# Patient Record
Sex: MALE | Race: WHITE | NOT HISPANIC OR LATINO | Employment: FULL TIME | URBAN - METROPOLITAN AREA
[De-identification: names, ages, dates, MRNs, and addresses within clinical notes are randomized per-mention and may not be internally consistent; named-entity substitution may affect disease eponyms.]

---

## 2021-10-01 ENCOUNTER — OFFICE VISIT (OUTPATIENT)
Dept: NEUROSURGERY | Facility: CLINIC | Age: 31
End: 2021-10-01
Payer: COMMERCIAL
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
## 2021-10-01 DIAGNOSIS — S22.080A COMPRESSION FRACTURE OF T12 VERTEBRA, INITIAL ENCOUNTER: Primary | ICD-10-CM

## 2021-10-01 PROCEDURE — 99999 PR PBB SHADOW E&M-EST. PATIENT-LVL II: CPT | Mod: PBBFAC,,, | Performed by: PHYSICIAN ASSISTANT

## 2021-10-01 PROCEDURE — 1160F PR REVIEW ALL MEDS BY PRESCRIBER/CLIN PHARMACIST DOCUMENTED: ICD-10-PCS | Mod: CPTII,S$GLB,, | Performed by: PHYSICIAN ASSISTANT

## 2021-10-01 PROCEDURE — 1159F MED LIST DOCD IN RCRD: CPT | Mod: CPTII,S$GLB,, | Performed by: PHYSICIAN ASSISTANT

## 2021-10-01 PROCEDURE — 1159F PR MEDICATION LIST DOCUMENTED IN MEDICAL RECORD: ICD-10-PCS | Mod: CPTII,S$GLB,, | Performed by: PHYSICIAN ASSISTANT

## 2021-10-01 PROCEDURE — 99204 PR OFFICE/OUTPT VISIT, NEW, LEVL IV, 45-59 MIN: ICD-10-PCS | Mod: S$GLB,,, | Performed by: PHYSICIAN ASSISTANT

## 2021-10-01 PROCEDURE — 1160F RVW MEDS BY RX/DR IN RCRD: CPT | Mod: CPTII,S$GLB,, | Performed by: PHYSICIAN ASSISTANT

## 2021-10-01 PROCEDURE — 99204 OFFICE O/P NEW MOD 45 MIN: CPT | Mod: S$GLB,,, | Performed by: PHYSICIAN ASSISTANT

## 2021-10-01 PROCEDURE — 99999 PR PBB SHADOW E&M-EST. PATIENT-LVL II: ICD-10-PCS | Mod: PBBFAC,,, | Performed by: PHYSICIAN ASSISTANT

## 2021-10-01 RX ORDER — TIZANIDINE 4 MG/1
4 TABLET ORAL EVERY 8 HOURS PRN
Qty: 40 TABLET | Refills: 0 | Status: SHIPPED | OUTPATIENT
Start: 2021-10-01

## 2021-10-01 RX ORDER — HYDROCODONE BITARTRATE AND ACETAMINOPHEN 5; 325 MG/1; MG/1
1 TABLET ORAL EVERY 8 HOURS PRN
Qty: 21 TABLET | Refills: 0 | Status: SHIPPED | OUTPATIENT
Start: 2021-10-01

## 2021-10-15 ENCOUNTER — HOSPITAL ENCOUNTER (OUTPATIENT)
Dept: RADIOLOGY | Facility: HOSPITAL | Age: 31
Discharge: HOME | End: 2021-10-15
Attending: PHYSICIAN ASSISTANT
Payer: COMMERCIAL
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
## 2021-10-15 DIAGNOSIS — S22.080A COMPRESSION FRACTURE OF T12 VERTEBRA, INITIAL ENCOUNTER: ICD-10-CM

## 2021-10-15 PROCEDURE — 72146 MRI CHEST SPINE W/O DYE: CPT | Mod: 26,,, | Performed by: RADIOLOGY

## 2021-10-15 PROCEDURE — 72148 MRI LUMBAR SPINE WITHOUT CONTRAST: ICD-10-PCS | Mod: 26,,, | Performed by: RADIOLOGY

## 2021-10-15 PROCEDURE — 72148 MRI LUMBAR SPINE W/O DYE: CPT | Mod: TC,PO

## 2021-10-15 PROCEDURE — 72146 MRI CHEST SPINE W/O DYE: CPT | Mod: TC,PO

## 2021-10-15 PROCEDURE — 72148 MRI LUMBAR SPINE W/O DYE: CPT | Mod: 26,,, | Performed by: RADIOLOGY

## 2021-10-15 PROCEDURE — 72146 MRI THORACIC SPINE WITHOUT CONTRAST: ICD-10-PCS | Mod: 26,,, | Performed by: RADIOLOGY

## 2021-10-17 ENCOUNTER — PATIENT MESSAGE (OUTPATIENT)
Dept: NEUROSURGERY | Facility: CLINIC | Age: 31
End: 2021-10-17
Payer: COMMERCIAL

## 2021-10-28 ENCOUNTER — OFFICE VISIT (OUTPATIENT)
Dept: NEUROSURGERY | Facility: CLINIC | Age: 31
End: 2021-10-28
Payer: COMMERCIAL

## 2021-10-28 VITALS — RESPIRATION RATE: 16 BRPM | BODY MASS INDEX: 22.19 KG/M2 | WEIGHT: 155 LBS | HEIGHT: 70 IN

## 2021-10-28 DIAGNOSIS — S22.080D COMPRESSION FRACTURE OF T12 VERTEBRA WITH ROUTINE HEALING, SUBSEQUENT ENCOUNTER: ICD-10-CM
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
## 2021-10-28 PROCEDURE — 99999 PR PBB SHADOW E&M-EST. PATIENT-LVL III: ICD-10-PCS | Mod: PBBFAC,,, | Performed by: PHYSICIAN ASSISTANT

## 2021-10-28 PROCEDURE — 99213 OFFICE O/P EST LOW 20 MIN: CPT | Mod: S$GLB,,, | Performed by: PHYSICIAN ASSISTANT

## 2021-10-28 PROCEDURE — 99999 PR PBB SHADOW E&M-EST. PATIENT-LVL III: CPT | Mod: PBBFAC,,, | Performed by: PHYSICIAN ASSISTANT

## 2021-10-28 PROCEDURE — 99213 PR OFFICE/OUTPT VISIT, EST, LEVL III, 20-29 MIN: ICD-10-PCS | Mod: S$GLB,,, | Performed by: PHYSICIAN ASSISTANT

## 2021-12-14 ENCOUNTER — HOSPITAL ENCOUNTER (OUTPATIENT)
Dept: RADIOLOGY | Facility: HOSPITAL | Age: 31
Discharge: HOME | End: 2021-12-14
Attending: PHYSICIAN ASSISTANT
Payer: COMMERCIAL

## 2021-12-14 ENCOUNTER — OFFICE VISIT (OUTPATIENT)
Dept: NEUROSURGERY | Facility: CLINIC | Age: 31
End: 2021-12-14
Payer: COMMERCIAL

## 2021-12-14 VITALS — WEIGHT: 155 LBS | RESPIRATION RATE: 16 BRPM | BODY MASS INDEX: 22.19 KG/M2 | HEIGHT: 70 IN

## 2021-12-14 DIAGNOSIS — S22.080D COMPRESSION FRACTURE OF T12 VERTEBRA WITH ROUTINE HEALING, SUBSEQUENT ENCOUNTER: ICD-10-CM

## 2021-12-14 DIAGNOSIS — M54.6 DORSALGIA OF THORACOLUMBAR REGION: ICD-10-CM

## 2021-12-14 DIAGNOSIS — M54.50 DORSALGIA OF THORACOLUMBAR REGION: ICD-10-CM
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
## 2021-12-14 DIAGNOSIS — S22.080D COMPRESSION FRACTURE OF T12 VERTEBRA WITH ROUTINE HEALING, SUBSEQUENT ENCOUNTER: Primary | ICD-10-CM

## 2021-12-14 PROCEDURE — 99213 OFFICE O/P EST LOW 20 MIN: CPT | Mod: S$GLB,,, | Performed by: PHYSICIAN ASSISTANT

## 2021-12-14 PROCEDURE — 99999 PR PBB SHADOW E&M-EST. PATIENT-LVL III: ICD-10-PCS | Mod: PBBFAC,,, | Performed by: PHYSICIAN ASSISTANT

## 2021-12-14 PROCEDURE — 72080 XR THORACOLUMBAR SPINE AP LATERAL: ICD-10-PCS | Mod: 26,,, | Performed by: RADIOLOGY

## 2021-12-14 PROCEDURE — 99213 PR OFFICE/OUTPT VISIT, EST, LEVL III, 20-29 MIN: ICD-10-PCS | Mod: S$GLB,,, | Performed by: PHYSICIAN ASSISTANT

## 2021-12-14 PROCEDURE — 72080 X-RAY EXAM THORACOLMB 2/> VW: CPT | Mod: 26,,, | Performed by: RADIOLOGY

## 2021-12-14 PROCEDURE — 99999 PR PBB SHADOW E&M-EST. PATIENT-LVL III: CPT | Mod: PBBFAC,,, | Performed by: PHYSICIAN ASSISTANT

## 2021-12-14 PROCEDURE — 72080 X-RAY EXAM THORACOLMB 2/> VW: CPT | Mod: TC

## 2021-12-18 ENCOUNTER — PATIENT MESSAGE (OUTPATIENT)
Dept: NEUROSURGERY | Facility: CLINIC | Age: 31
End: 2021-12-18
Payer: COMMERCIAL

## 2022-01-03 ENCOUNTER — CLINICAL SUPPORT (OUTPATIENT)
Dept: REHABILITATION | Facility: HOSPITAL | Age: 32
End: 2022-01-03
Attending: PHYSICIAN ASSISTANT
Payer: COMMERCIAL

## 2022-01-03 DIAGNOSIS — S22.080D COMPRESSION FRACTURE OF T12 VERTEBRA WITH ROUTINE HEALING, SUBSEQUENT ENCOUNTER: ICD-10-CM

## 2022-01-03 DIAGNOSIS — M54.6 ACUTE BILATERAL THORACIC BACK PAIN: ICD-10-CM

## 2022-01-03 DIAGNOSIS — M54.50 DORSALGIA OF THORACOLUMBAR REGION: ICD-10-CM

## 2022-01-03 DIAGNOSIS — M54.6 DORSALGIA OF THORACOLUMBAR REGION: ICD-10-CM

## 2022-01-03 PROCEDURE — 97110 THERAPEUTIC EXERCISES: CPT | Performed by: PHYSICAL THERAPIST

## 2022-01-03 PROCEDURE — 97140 MANUAL THERAPY 1/> REGIONS: CPT | Performed by: PHYSICAL THERAPIST

## 2022-01-03 PROCEDURE — 97161 PT EVAL LOW COMPLEX 20 MIN: CPT | Performed by: PHYSICAL THERAPIST

## 2022-01-03 NOTE — PATIENT INSTRUCTIONS
Posture - Sitting    Sit upright, head facing forward. Scoot your tailbone all the way to the back of your chair. Lean slightly forward and place a rolled up towel at your waist. Lean back so shoulder blades lightly touch the back of the chair. Keep shoulders relaxed, and avoid rounded back. Keep hips level with knees. Avoid crossing legs for long periods.       Prayer Stretch    On hands and knees reach forward until a stretch is felt in the mid-back. Hold 15 seconds. Repeat reaching to right and to left.  Repeat 4 times per set. Do 1 sets per session. Do 1 sessions per day.        Bridging    Flatten back against floor then slowly raise buttocks from floor, keeping stomach tight. Hold 3 seconds.  Repeat 20 times per set. Do 2 sets per session. Do 1 sessions per day.      Bent Leg Lift (Hook-Lying)    Tighten stomach and slowly raise right leg from floor. Keep trunk rigid. Slowly lower and switch sides.  Repeat 10 times per set. Do 3 sets per session. Do 1 sessions per day.      Slow Stand to Sit    Stand up, using hands if needed. Keep chest and head upright, bend your knees, don't use hands, and slowly lower back to the chair. Move as slowly as you can.  Repeat 12 times per set. Do 3 sets per session. Do 1 sessions per day.    Copyright © I. All rights reserved.
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
## 2022-01-05 PROBLEM — M54.6 ACUTE BILATERAL THORACIC BACK PAIN: Status: ACTIVE | Noted: 2022-01-05

## 2022-01-10 ENCOUNTER — CLINICAL SUPPORT (OUTPATIENT)
Dept: REHABILITATION | Facility: HOSPITAL | Age: 32
End: 2022-01-10
Payer: COMMERCIAL

## 2022-01-10 DIAGNOSIS — M54.6 ACUTE BILATERAL THORACIC BACK PAIN: ICD-10-CM

## 2022-01-10 PROCEDURE — 97110 THERAPEUTIC EXERCISES: CPT | Performed by: PHYSICAL THERAPIST

## 2022-01-12 ENCOUNTER — CLINICAL SUPPORT (OUTPATIENT)
Dept: REHABILITATION | Facility: HOSPITAL | Age: 32
End: 2022-01-12
Payer: COMMERCIAL

## 2022-01-12 DIAGNOSIS — M54.6 ACUTE BILATERAL THORACIC BACK PAIN: ICD-10-CM

## 2022-01-12 PROCEDURE — 97110 THERAPEUTIC EXERCISES: CPT | Performed by: PHYSICAL THERAPIST

## 2022-01-19 ENCOUNTER — CLINICAL SUPPORT (OUTPATIENT)
Dept: REHABILITATION | Facility: HOSPITAL | Age: 32
End: 2022-01-19
Payer: COMMERCIAL

## 2022-01-19 DIAGNOSIS — M54.6 ACUTE BILATERAL THORACIC BACK PAIN: ICD-10-CM

## 2022-01-19 PROCEDURE — 97110 THERAPEUTIC EXERCISES: CPT | Performed by: PHYSICAL THERAPIST

## 2022-01-20 NOTE — PROGRESS NOTES
"[facility]   Physical Therapy Treatment Note     Name: Chad Rajan  Clinic Number: [id number]    Therapy Diagnosis:   Encounter Diagnosis   Name Primary?    Acute bilateral thoracic back pain      Physician: Mustapha Wilson PA*    Visit Date: 1/12/2022    Physician Orders: PT Eval and Treat   Medical Diagnosis from Referral:   S22.080D (ICD-10-CM) - Compression fracture of T12 vertebra with routine healing, subsequent encounter   M54.6,M54.50 (ICD-10-CM) - Dorsalgia of thoracolumbar region         Evaluation Date: 1/3/2022  Authorization Period Expiration: 12/31/2022  Plan of Care Expiration: 2/17/2022  Visit # / Visits authorized: 3/20  FOTO: 1/3      PTA Visit #: 0/5     Time In: 215  Time Out: 300  Total Billable Time: 45 minutes    Precautions: Standard    SUBJECTIVE     Pt reports: no significant soreness noted. Pt feels improving symptoms and is becoming more active  .  He was compliant with home exercise program.  Response to previous treatment: soreness  Functional change: None yet    Pain: 2/10  Location: bilateral back      OBJECTIVE     Objective Measures updated at progress report unless specified.     TREATMENT     Chad received the treatments listed below:      Chad received therapeutic exercises to develop strength, posture and core stabilization for 45 minutes including:  Nustep 6'  Bridges with band GTB 30x  Single leg bridges 20x B  Side planks with hip ABD 20x each leg  Quadruped hip ABD BTB 25x each leg  Quadruped hip extension, 3# 25x  Lateral squat walks RTB 3 laps  Monster walks RTB 3 laps  Pallof press 4 plates 25x  Prone UE/LE alt on stability ball 20x  Standing clamshells GTB 30x each leg  Side plank hold, 20" x 3 each side  Tall kneel pallof press with arm raise 20x each side, 3 plates      Chad received the following manual therapy techniques: Myofacial release and Soft tissue Mobilization were applied to the: low back for 0 minutes, " including:      Chad participated in neuromuscular re-education activities to improve: Balance, Sense and Posture for 0 minutes. The following activities were included:      Chad participated in dynamic functional therapeutic activities to improve functional performance for 0  minutes, including:        Patient Education and Home Exercises     Education provided:   - HEP provided  - continue progress strengthening program at home     Written Home Exercises Provided: yes.  Exercises were reviewed and Chad was able to demonstrate them prior to the end of the session.  Chad demonstrated good  understanding of the education provided.      See EMR under Patient Instructions for exercises provided on evaluatin.    ASSESSMENT     Pt increased core strengthening today with no significant increase in pain overall. Pt feels improving symptoms and can perform more strenuous activity. Pt will continue to benefit from further core and hip strengthening to help stabilize spine from fracture.      Chad Is progressing well towards his goals.   Pt prognosis is Excellent.     Pt will continue to benefit from skilled outpatient physical therapy to address the deficits listed in the problem list box on initial evaluation, provide pt/family education and to maximize pt's level of independence in the home and community environment.     Pt's spiritual, cultural and educational needs considered and pt agreeable to plan of care and goals.     Anticipated barriers to physical therapy: None    Goals:   Short Term Goals (3 Weeks):  1. Patient will be compliant with home exercise program to supplement therapy in promoting functional mobility.  2. Patient will perform box lift (10#) with good control to demonstrate improved core strength.     Long Term Goals (6 Weeks):   1. Patient will improve FOTO score to </= 15% limited to decrease perceived limitation with maintaining/changing body position.   2. Patient will perform deep squat  with 25# with good control to demonstrate improved core strength.  3. Patient will improve impaired lower extremity myotomes/manual muscle tests to >/=5/5 to improve strength for functional tasks.    PLAN     Continue with physical therapy as planned.     Plan of care Certification: 1/3/2022 to 2/17/2022.     Outpatient Physical Therapy 2 times weekly for 6 weeks to include the following interventions: Electrical Stimulation IFC, Gait Training, Manual Therapy, Moist Heat/ Ice, Neuromuscular Re-ed, Orthotic Management and Training, Patient Education, Self Care, Therapeutic Activities, Therapeutic Exercise and Ultrasound, ASTYM, Kinesiotaping PRN, Functional Dry Needling    Ben Chairez, PT, DPT

## 2022-01-20 NOTE — PROGRESS NOTES
"[facility]   Physical Therapy Treatment Note     Name: Chad Rajan  Clinic Number: [id number]    Therapy Diagnosis:   Encounter Diagnosis   Name Primary?    Acute bilateral thoracic back pain      Physician: Mustapha Wilson PA*    Visit Date: 1/10/2022    Physician Orders: PT Eval and Treat   Medical Diagnosis from Referral:   S22.080D (ICD-10-CM) - Compression fracture of T12 vertebra with routine healing, subsequent encounter   M54.6,M54.50 (ICD-10-CM) - Dorsalgia of thoracolumbar region         Evaluation Date: 1/3/2022  Authorization Period Expiration: 12/31/2022  Plan of Care Expiration: 2/17/2022  Visit # / Visits authorized: 2/20  FOTO: 1/3      PTA Visit #: 0/5     Time In: 200  Time Out: 245  Total Billable Time: 45 minutes    Precautions: Standard    SUBJECTIVE     Pt reports: some soreness in back following previous visit, went down next day. No increase in pain overall.  .  He was compliant with home exercise program.  Response to previous treatment: soreness  Functional change: None yet    Pain: 2/10  Location: bilateral back      OBJECTIVE     Objective Measures updated at progress report unless specified.     TREATMENT     Chad received the treatments listed below:      Chad received therapeutic exercises to develop strength, posture and core stabilization for 45 minutes including:  Nustep 6'  Bridges with band GTB 30x  Sidelying hip ABD 3x8 bilateral  Quadruped hip extension, 2# 25x  Lateral squat walks RTB 3 laps  Monster walks RTB 3 laps  Pallof press 4 plates 25x  Prone UE/LE alt on stability ball 20x  LTR 5" hold, 15x  Janice pose 15" hold, 5x      Chad received the following manual therapy techniques: Myofacial release and Soft tissue Mobilization were applied to the: low back for 0 minutes, including:      Chad participated in neuromuscular re-education activities to improve: Balance, Sense and Posture for 0 minutes. The following activities were " included:      Chad participated in dynamic functional therapeutic activities to improve functional performance for 0  minutes, including:        Patient Education and Home Exercises     Education provided:   - HEP provided  - continue progress strengthening program at home     Written Home Exercises Provided: yes.  Exercises were reviewed and Chad was able to demonstrate them prior to the end of the session.  Chad demonstrated good  understanding of the education provided.      See EMR under Patient Instructions for exercises provided on evaluatin.    ASSESSMENT     Pt tolerated increase in core strengthening and hip strengthening with minimal discomfort overall. No increase in pain to low back area where fx is located. Pt will continue to benefit from further core strength progressions along with hip strengthening progression. Will monitor pain and soreness and report back with any acute/sharp pains.     Chad Is progressing well towards his goals.   Pt prognosis is Excellent.     Pt will continue to benefit from skilled outpatient physical therapy to address the deficits listed in the problem list box on initial evaluation, provide pt/family education and to maximize pt's level of independence in the home and community environment.     Pt's spiritual, cultural and educational needs considered and pt agreeable to plan of care and goals.     Anticipated barriers to physical therapy: Nont    Goals:   Short Term Goals (3 Weeks):  1. Patient will be compliant with home exercise program to supplement therapy in promoting functional mobility.  2. Patient will perform box lift (10#) with good control to demonstrate improved core strength.     Long Term Goals (6 Weeks):   1. Patient will improve FOTO score to </= 15% limited to decrease perceived limitation with maintaining/changing body position.   2. Patient will perform deep squat with 25# with good control to demonstrate improved core strength.  3. Patient will  improve impaired lower extremity myotomes/manual muscle tests to >/=5/5 to improve strength for functional tasks.    PLAN     Continue with physical therapy as planned.     Plan of care Certification: 1/3/2022 to 2/17/2022.     Outpatient Physical Therapy 2 times weekly for 6 weeks to include the following interventions: Electrical Stimulation IFC, Gait Training, Manual Therapy, Moist Heat/ Ice, Neuromuscular Re-ed, Orthotic Management and Training, Patient Education, Self Care, Therapeutic Activities, Therapeutic Exercise and Ultrasound, ASTYM, Kinesiotaping PRN, Functional Dry Needling    Ben Chairez, PT, DPT

## 2022-01-22 NOTE — PROGRESS NOTES
"[facility]   Physical Therapy Treatment Note     Name: Chad Rajan  Clinic Number: [id number]    Therapy Diagnosis:   Encounter Diagnosis   Name Primary?    Acute bilateral thoracic back pain      Physician: Mustapha Wilson PA*    Visit Date: 1/19/2022    Physician Orders: PT Eval and Treat   Medical Diagnosis from Referral:   S22.080D (ICD-10-CM) - Compression fracture of T12 vertebra with routine healing, subsequent encounter   M54.6,M54.50 (ICD-10-CM) - Dorsalgia of thoracolumbar region         Evaluation Date: 1/3/2022  Authorization Period Expiration: 12/31/2022  Plan of Care Expiration: 2/17/2022  Visit # / Visits authorized: 3/20  FOTO: 1/3      PTA Visit #: 0/5     Time In: 215  Time Out: 300  Total Billable Time: 45 minutes    Precautions: Standard    SUBJECTIVE     Pt reports: continues to have some low back soreness following session but no significant increase in sharp pains or instability feelings.   .  He was compliant with home exercise program.  Response to previous treatment: soreness  Functional change: None yet    Pain: 2/10  Location: bilateral back      OBJECTIVE     Objective Measures updated at progress report unless specified.     TREATMENT     Chad received the treatments listed below:      Chad received therapeutic exercises to develop strength, posture and core stabilization for 45 minutes including:  Elliptical 6'  Bridges with band GTB 30x  Single leg bridges 20x B  Side planks with hip ABD 20x each leg  Quadruped hip ABD BTB 25x each leg  Quadruped hip extension, 3# 25x  Lateral squat walks GTB 3 laps  Monster walks GTB 3 laps  Pallof press 4 plates 25x  Prone UE/LE alt on stability ball 20x  Standing clamshells GTB 30x each leg  Side plank hold, 20" x 3 each side  Table top position:  Marches (4) 15x  UE (20x)  Combined UE and LE 2x12  KB pullovers 7.5# (20x)    HELD:  Tall kneel pallof press with arm raise 20x each side, 3 plates      Chad " received the following manual therapy techniques: Myofacial release and Soft tissue Mobilization were applied to the: low back for 0 minutes, including:      Chad participated in neuromuscular re-education activities to improve: Balance, Sense and Posture for 0 minutes. The following activities were included:      Chad participated in dynamic functional therapeutic activities to improve functional performance for 0  minutes, including:        Patient Education and Home Exercises     Education provided:   - HEP provided  - continue progress strengthening program at home     Written Home Exercises Provided: yes.  Exercises were reviewed and Chad was able to demonstrate them prior to the end of the session.  Chad demonstrated good  understanding of the education provided.      See EMR under Patient Instructions for exercises provided on evaluatin.    ASSESSMENT     Pt increased more flexion strengthening in core today with no increase in symptoms overall. Pt feels improving overall body endurance and strength. No increase in sharp, acute pains. Pt will continue to benefit from further core strengthening to stabilize low back from fracture. .      Chad Is progressing well towards his goals.   Pt prognosis is Excellent.     Pt will continue to benefit from skilled outpatient physical therapy to address the deficits listed in the problem list box on initial evaluation, provide pt/family education and to maximize pt's level of independence in the home and community environment.     Pt's spiritual, cultural and educational needs considered and pt agreeable to plan of care and goals.     Anticipated barriers to physical therapy: None    Goals:   Short Term Goals (3 Weeks):  1. Patient will be compliant with home exercise program to supplement therapy in promoting functional mobility.  2. Patient will perform box lift (10#) with good control to demonstrate improved core strength.     Long Term Goals (6 Weeks):   1.  Patient will improve FOTO score to </= 15% limited to decrease perceived limitation with maintaining/changing body position.   2. Patient will perform deep squat with 25# with good control to demonstrate improved core strength.  3. Patient will improve impaired lower extremity myotomes/manual muscle tests to >/=5/5 to improve strength for functional tasks.    PLAN     Continue with physical therapy as planned.     Plan of care Certification: 1/3/2022 to 2/17/2022.     Outpatient Physical Therapy 2 times weekly for 6 weeks to include the following interventions: Electrical Stimulation IFC, Gait Training, Manual Therapy, Moist Heat/ Ice, Neuromuscular Re-ed, Orthotic Management and Training, Patient Education, Self Care, Therapeutic Activities, Therapeutic Exercise and Ultrasound, ASTYM, Kinesiotaping PRN, Functional Dry Needling    Ben Chairez, PT, DPT

## 2022-01-27 ENCOUNTER — CLINICAL SUPPORT (OUTPATIENT)
Dept: REHABILITATION | Facility: HOSPITAL | Age: 32
End: 2022-01-27
Payer: COMMERCIAL

## 2022-01-27 DIAGNOSIS — M54.6 ACUTE BILATERAL THORACIC BACK PAIN: ICD-10-CM

## 2022-01-27 PROCEDURE — 97110 THERAPEUTIC EXERCISES: CPT | Performed by: PHYSICAL THERAPIST

## 2022-01-27 NOTE — PROGRESS NOTES
"[facility]   Physical Therapy Treatment Note     Name: Chad Rajan  Clinic Number: [id number]    Therapy Diagnosis:   Encounter Diagnosis   Name Primary?    Acute bilateral thoracic back pain      Physician: Mustapha Wilson PA*    Visit Date: 1/27/2022    Physician Orders: PT Eval and Treat   Medical Diagnosis from Referral:   S22.080D (ICD-10-CM) - Compression fracture of T12 vertebra with routine healing, subsequent encounter   M54.6,M54.50 (ICD-10-CM) - Dorsalgia of thoracolumbar region         Evaluation Date: 1/3/2022  Authorization Period Expiration: 12/31/2022  Plan of Care Expiration: 2/17/2022  Visit # / Visits authorized: 4/20  FOTO: 1/3      PTA Visit #: 0/5     Time In: 230  Time Out: 312  Total Billable Time: 42 minutes    Precautions: Standard    SUBJECTIVE     Pt reports: significant soreness in low back and hips following previous session. Pain when turning over in bed and arching back.   .  He was compliant with home exercise program.  Response to previous treatment: soreness  Functional change: None yet    Pain: 2/10  Location: bilateral back      OBJECTIVE     Objective Measures updated at progress report unless specified.     TREATMENT     Chad received the treatments listed below:      Chad received therapeutic exercises to develop strength, posture and core stabilization for 42 minutes including:    Elliptical 6'  Bridges with band GTB 30x  Single leg bridges 20x B  Side planks with hip ABD 20x each leg  Quadruped hip ABD BTB 25x each leg  Lateral squat walks BTB 3 laps, 10#KB goblet hold  Monster walks BTB 3 laps  Pallof press with bosu 4 plates 25x each way  Prone UE/LE alt on stability ball 20x  Prone plank with hip extension 20x each leg  Standing clamshells BTB 30x each leg  Side plank hold, 20" x 3 each side    Table top position:  Marches (4) 15x  UE (20x)  Combined UE and LE 2x12  KB pullovers 7.5# (25x)        Chad received the following manual " therapy techniques: Myofacial release and Soft tissue Mobilization were applied to the: low back for 0 minutes, including:      Chad participated in neuromuscular re-education activities to improve: Balance, Sense and Posture for 0 minutes. The following activities were included:      Chad participated in dynamic functional therapeutic activities to improve functional performance for 0  minutes, including:        Patient Education and Home Exercises     Education provided:   - HEP provided  - continue progress strengthening program at home     Written Home Exercises Provided: yes.  Exercises were reviewed and Chad was able to demonstrate them prior to the end of the session.  Chad demonstrated good  understanding of the education provided.      See EMR under Patient Instructions for exercises provided on evaluatin.    ASSESSMENT     Pt educated to continue core strengthening and hip strengthening at home to help with stability of lumbar spine. Pt educated to avoid heavy extension at home. Will continue with core strengthening progression in PT.     Chad Is progressing well towards his goals.   Pt prognosis is Excellent.     Pt will continue to benefit from skilled outpatient physical therapy to address the deficits listed in the problem list box on initial evaluation, provide pt/family education and to maximize pt's level of independence in the home and community environment.     Pt's spiritual, cultural and educational needs considered and pt agreeable to plan of care and goals.     Anticipated barriers to physical therapy: None    Goals:   Short Term Goals (3 Weeks):  1. Patient will be compliant with home exercise program to supplement therapy in promoting functional mobility.  2. Patient will perform box lift (10#) with good control to demonstrate improved core strength.     Long Term Goals (6 Weeks):   1. Patient will improve FOTO score to </= 15% limited to decrease perceived limitation with  maintaining/changing body position.   2. Patient will perform deep squat with 25# with good control to demonstrate improved core strength.  3. Patient will improve impaired lower extremity myotomes/manual muscle tests to >/=5/5 to improve strength for functional tasks.    PLAN     Continue with physical therapy as planned.     Plan of care Certification: 1/3/2022 to 2/17/2022.     Outpatient Physical Therapy 2 times weekly for 6 weeks to include the following interventions: Electrical Stimulation IFC, Gait Training, Manual Therapy, Moist Heat/ Ice, Neuromuscular Re-ed, Orthotic Management and Training, Patient Education, Self Care, Therapeutic Activities, Therapeutic Exercise and Ultrasound, ASTYM, Kinesiotaping PRN, Functional Dry Needling    Ben Chairez, PT, DPT

## 2022-02-02 ENCOUNTER — CLINICAL SUPPORT (OUTPATIENT)
Dept: REHABILITATION | Facility: HOSPITAL | Age: 32
End: 2022-02-02
Payer: COMMERCIAL

## 2022-02-02 DIAGNOSIS — M54.6 ACUTE BILATERAL THORACIC BACK PAIN: ICD-10-CM

## 2022-02-02 PROCEDURE — 97110 THERAPEUTIC EXERCISES: CPT | Performed by: PHYSICAL THERAPIST

## 2022-02-02 NOTE — PROGRESS NOTES
"[facility]   Physical Therapy Treatment Note     Name: Chad Rajan  Clinic Number: [id number]    Therapy Diagnosis:   Encounter Diagnosis   Name Primary?    Acute bilateral thoracic back pain      Physician: Mustapha Wilson PA*    Visit Date: 2/2/2022    Physician Orders: PT Eval and Treat   Medical Diagnosis from Referral:   S22.080D (ICD-10-CM) - Compression fracture of T12 vertebra with routine healing, subsequent encounter   M54.6,M54.50 (ICD-10-CM) - Dorsalgia of thoracolumbar region         Evaluation Date: 1/3/2022  Authorization Period Expiration: 12/31/2022  Plan of Care Expiration: 2/17/2022  Visit # / Visits authorized: 5/20  FOTO: 1/3      PTA Visit #: 0/5     Time In: 222  Time Out: 300  Total Billable Time: 38 minutes    Precautions: Standard    SUBJECTIVE     Pt reports: minimal stiffness along low back, especially [place]. But no further sharp pains.   .  He was compliant with home exercise program.  Response to previous treatment: soreness  Functional change: None yet    Pain: 2/10  Location: bilateral back      OBJECTIVE     Objective Measures updated at progress report unless specified.     TREATMENT     Chad received the treatments listed below:      Chad received therapeutic exercises to develop strength, posture and core stabilization for 38 minutes including:    Elliptical 3' fwd, 3' back  Trap bar deadlift 95#, 3x6  Pallof press with lateral squat walks BTB 15x each way  SL Deadlifts on turf. 15#KB 2x12 bilateral  Monster walks BTB 3 laps  Prone plank arm push outs 3 sets 5 on each arm.   Standing Rows - both arms - 6 plates, 3x10      HELD  Bridges with band GTB 30x  Single leg bridges 20x B  Side planks with hip ABD 20x each leg  Quadruped hip ABD BTB 25x each leg  Prone UE/LE alt on stability ball 20x  Side plank hold, 20" x 3 each side  Table top position:  Marches (4) 15x  UE (20x)  Combined UE and LE 2x12  KB pullovers " 7.5# (25x)    Chad received the following manual therapy techniques: Myofacial release and Soft tissue Mobilization were applied to the: low back for 0 minutes, including:      Chad participated in neuromuscular re-education activities to improve: Balance, Sense and Posture for 0 minutes. The following activities were included:      Chad participated in dynamic functional therapeutic activities to improve functional performance for 0  minutes, including:        Patient Education and Home Exercises     Education provided:   - HEP provided  - continue progress strengthening program at home     Written Home Exercises Provided: yes.  Exercises were reviewed and Chad was able to demonstrate them prior to the end of the session.  Chad demonstrated good  understanding of the education provided.      See EMR under Patient Instructions for exercises provided on evaluatin.    ASSESSMENT     Pt presents with increased fatigue and some low back tenderness with full therex today. Pt feels hip and glutes moreso than anything else. Pt will continue to benefit from core strengthening progressions along with glute strengthening to help support low back during functional activity.   .     Chad Is progressing well towards his goals.   Pt prognosis is Excellent.     Pt will continue to benefit from skilled outpatient physical therapy to address the deficits listed in the problem list box on initial evaluation, provide pt/family education and to maximize pt's level of independence in the home and community environment.     Pt's spiritual, cultural and educational needs considered and pt agreeable to plan of care and goals.     Anticipated barriers to physical therapy: None    Goals:   Short Term Goals (3 Weeks): (Progressing, not met)  1. Patient will be compliant with home exercise program to supplement therapy in promoting functional mobility.  2. Patient will perform box lift (10#) with good control to demonstrate  improved core strength.     Long Term Goals (6 Weeks): (Progressing, not met)  1. Patient will improve FOTO score to </= 15% limited to decrease perceived limitation with maintaining/changing body position.   2. Patient will perform deep squat with 25# with good control to demonstrate improved core strength.  3. Patient will improve impaired lower extremity myotomes/manual muscle tests to >/=5/5 to improve strength for functional tasks.    PLAN     Continue with physical therapy as planned.     Plan of care Certification: 1/3/2022 to 2/17/2022.     Outpatient Physical Therapy 2 times weekly for 6 weeks to include the following interventions: Electrical Stimulation IFC, Gait Training, Manual Therapy, Moist Heat/ Ice, Neuromuscular Re-ed, Orthotic Management and Training, Patient Education, Self Care, Therapeutic Activities, Therapeutic Exercise and Ultrasound, ASTYM, Kinesiotaping PRN, Functional Dry Needling    Ben Chairez, PT, DPT

## 2022-02-07 ENCOUNTER — CLINICAL SUPPORT (OUTPATIENT)
Dept: REHABILITATION | Facility: HOSPITAL | Age: 32
End: 2022-02-07
Payer: COMMERCIAL

## 2022-02-07 DIAGNOSIS — M54.6 ACUTE BILATERAL THORACIC BACK PAIN: ICD-10-CM

## 2022-02-07 PROCEDURE — 97110 THERAPEUTIC EXERCISES: CPT | Performed by: PHYSICAL THERAPIST

## 2022-02-07 NOTE — PROGRESS NOTES
[facility]   Physical Therapy Treatment Note     Name: Chad Rajan  Clinic Number: [id number]    Therapy Diagnosis:   Encounter Diagnosis   Name Primary?    Acute bilateral thoracic back pain      Physician: Mustapha Wilson PA*    Visit Date: 2/7/2022    Physician Orders: PT Eval and Treat   Medical Diagnosis from Referral:   S22.080D (ICD-10-CM) - Compression fracture of T12 vertebra with routine healing, subsequent encounter   M54.6,M54.50 (ICD-10-CM) - Dorsalgia of thoracolumbar region         Evaluation Date: 1/3/2022  Authorization Period Expiration: 12/31/2022  Plan of Care Expiration: 2/17/2022  Visit # / Visits authorized: 6/20  FOTO: 1/3      PTA Visit #: 0/5     Time In: 201  Time Out: 245  Total Billable Time: 44 minutes    Precautions: Standard    SUBJECTIVE     Pt reports: rode in car to [place] and back over weekend. Some stiffness. Extremely sore after previous session  .  He was compliant with home exercise program.  Response to previous treatment: soreness  Functional change: None yet    Pain: 2/10  Location: bilateral back      OBJECTIVE     Objective Measures updated at progress report unless specified.     TREATMENT     Chad received the treatments listed below:      Chad received therapeutic exercises to develop strength, posture and core stabilization for 44 minutes including:    Elliptical 6' forward    Table top position:  Marches (4) 15x  UE (20x)  Combined UE and LE 2x12  KB pullovers 10x# (25x)    Standing Rows - both arms - 6 plates, 3x10  Monster walks BTB 3 laps  Lateral squat walks BTB 3 laps  Pallof press on stability ball 25x each way  Prone plank arm push outs 3 sets 5 on each arm.   Side planks with hip ABD 20x each leg  Bosu squats 20x, 15#KB      HELD:  Trap bar deadlift 95#, 3x6  SL Deadlifts on turf. 15#KB 2x12 bilateral  Bridges with band GTB 30x  Single leg bridges 20x B  Quadruped hip ABD BTB 25x each leg  Prone UE/LE alt on stability ball  "20x  Side plank hold, 20" x 3 each side        Chad received the following manual therapy techniques: Myofacial release and Soft tissue Mobilization were applied to the: low back for 0 minutes, including:      Chad participated in neuromuscular re-education activities to improve: Balance, Sense and Posture for 0 minutes. The following activities were included:      Chad participated in dynamic functional therapeutic activities to improve functional performance for 0  minutes, including:        Patient Education and Home Exercises     Education provided:   - HEP provided  - continue progress strengthening program at home     Written Home Exercises Provided: yes.  Exercises were reviewed and Chad was able to demonstrate them prior to the end of the session.  Chad demonstrated good  understanding of the education provided.      See EMR under Patient Instructions for exercises provided on evaluatin.    ASSESSMENT     Pt progressed to full core strengthening program to improve lumbar spine stability. Pt did have some fatigue overall but not as bad as previous visit. Pt feels improving hip and core strength and will benefit from further strengthening progressions to improve ADL's.     Chad Is progressing well towards his goals.   Pt prognosis is Excellent.     Pt will continue to benefit from skilled outpatient physical therapy to address the deficits listed in the problem list box on initial evaluation, provide pt/family education and to maximize pt's level of independence in the home and community environment.     Pt's spiritual, cultural and educational needs considered and pt agreeable to plan of care and goals.     Anticipated barriers to physical therapy: None    Goals:   Short Term Goals (3 Weeks): (Progressing, not met)  1. Patient will be compliant with home exercise program to supplement therapy in promoting functional mobility.  2. Patient will perform box lift (10#) with good control to demonstrate " improved core strength.     Long Term Goals (6 Weeks): (Progressing, not met)  1. Patient will improve FOTO score to </= 15% limited to decrease perceived limitation with maintaining/changing body position.   2. Patient will perform deep squat with 25# with good control to demonstrate improved core strength.  3. Patient will improve impaired lower extremity myotomes/manual muscle tests to >/=5/5 to improve strength for functional tasks.    PLAN     Continue with physical therapy as planned.     Plan of care Certification: 1/3/2022 to 2/17/2022.     Outpatient Physical Therapy 2 times weekly for 6 weeks to include the following interventions: Electrical Stimulation IFC, Gait Training, Manual Therapy, Moist Heat/ Ice, Neuromuscular Re-ed, Orthotic Management and Training, Patient Education, Self Care, Therapeutic Activities, Therapeutic Exercise and Ultrasound, ASTYM, Kinesiotaping PRN, Functional Dry Needling    Ben Chairez, PT, DPT

## 2022-02-16 ENCOUNTER — CLINICAL SUPPORT (OUTPATIENT)
Dept: REHABILITATION | Facility: HOSPITAL | Age: 32
End: 2022-02-16
Payer: COMMERCIAL

## 2022-02-16 DIAGNOSIS — M54.6 ACUTE BILATERAL THORACIC BACK PAIN: ICD-10-CM

## 2022-02-16 PROCEDURE — 97110 THERAPEUTIC EXERCISES: CPT | Performed by: PHYSICAL THERAPIST

## 2022-02-16 NOTE — PROGRESS NOTES
[facility]   Physical Therapy Treatment Note     Name: Chad Rajan  Clinic Number: [id number]    Therapy Diagnosis:   Encounter Diagnosis   Name Primary?    Acute bilateral thoracic back pain      Physician: Mustapha Wilson PA*    Visit Date: 2/16/2022    Physician Orders: PT Eval and Treat   Medical Diagnosis from Referral:   S22.080D (ICD-10-CM) - Compression fracture of T12 vertebra with routine healing, subsequent encounter   M54.6,M54.50 (ICD-10-CM) - Dorsalgia of thoracolumbar region         Evaluation Date: 1/3/2022  Authorization Period Expiration: 12/31/2022  Plan of Care Expiration: 2/17/2022  Visit # / Visits authorized: 7/20  FOTO: 1/3      PTA Visit #: 0/5     Time In: 0839  Time Out: 0917  Total Billable Time: 38 minutes    Precautions: Standard    SUBJECTIVE     Pt reports: more fatigued than anything. No increase in back pain at this time  .  He was compliant with home exercise program.  Response to previous treatment: soreness  Functional change: None yet    Pain: 2/10  Location: bilateral back      OBJECTIVE     Objective Measures updated at progress report unless specified.     TREATMENT     Chad received the treatments listed below:      Chad received therapeutic exercises to develop strength, posture and core stabilization for 38 minutes including:    Elliptical 6' forward  Table top position:  Marches (4) 15x  UE (20x)  Combined UE and LE 2x12  KB pullovers 10x# (25x)  Single leg bridges 30x each leg  Monster walks BTB 3 laps  Lateral squat walks BTB 3 laps  SL Deadlifts on turf. 15#KB 2x12 bilateral  Pallof press 25x each way 5 plates  Side planks with hip ABD 20x each leg  Standing Clamshells BTB 30x each leg    HELD:  Standing Rows - both arms - 6 plates, 3x10  Prone plank arm push outs 3 sets 5 on each arm.   Bosu squats 20x, 15#KB  Trap bar deadlift 95#, 3x6  Bridges with band GTB 30x  Quadruped hip ABD BTB 25x each leg  Prone UE/LE alt on stability ball  20x      Chad received the following manual therapy techniques: Myofacial release and Soft tissue Mobilization were applied to the: low back for 0 minutes, including:      Chad participated in neuromuscular re-education activities to improve: Balance, Sense and Posture for 0 minutes. The following activities were included:      Chad participated in dynamic functional therapeutic activities to improve functional performance for 0  minutes, including:        Patient Education and Home Exercises     Education provided:   - HEP provided  - continue progress strengthening program at home     Written Home Exercises Provided: yes.  Exercises were reviewed and Chad was able to demonstrate them prior to the end of the session.  Chad demonstrated good  understanding of the education provided.      See EMR under Patient Instructions for exercises provided on evaluatin.    ASSESSMENT     Pt completed full core and LE strengthening with no increase in pain. Pt feels more fatigued than anything. Pt will continue to benefit from further core strengthening to improve ADL's and lifting activity at home.    Chad Is progressing well towards his goals.   Pt prognosis is Excellent.     Pt will continue to benefit from skilled outpatient physical therapy to address the deficits listed in the problem list box on initial evaluation, provide pt/family education and to maximize pt's level of independence in the home and community environment.     Pt's spiritual, cultural and educational needs considered and pt agreeable to plan of care and goals.     Anticipated barriers to physical therapy: None    Goals:   Short Term Goals (3 Weeks): (Progressing, not met)  1. Patient will be compliant with home exercise program to supplement therapy in promoting functional mobility.  2. Patient will perform box lift (10#) with good control to demonstrate improved core strength.     Long Term Goals (6 Weeks): (Progressing, not met)  1. Patient  will improve FOTO score to </= 15% limited to decrease perceived limitation with maintaining/changing body position.   2. Patient will perform deep squat with 25# with good control to demonstrate improved core strength.  3. Patient will improve impaired lower extremity myotomes/manual muscle tests to >/=5/5 to improve strength for functional tasks.    PLAN     Continue with physical therapy as planned.     Plan of care Certification: 1/3/2022 to 2/17/2022.     Outpatient Physical Therapy 2 times weekly for 6 weeks to include the following interventions: Electrical Stimulation IFC, Gait Training, Manual Therapy, Moist Heat/ Ice, Neuromuscular Re-ed, Orthotic Management and Training, Patient Education, Self Care, Therapeutic Activities, Therapeutic Exercise and Ultrasound, ASTYM, Kinesiotaping PRN, Functional Dry Needling    Ben Chairez, PT, DPT

## 2022-02-18 ENCOUNTER — CLINICAL SUPPORT (OUTPATIENT)
Dept: REHABILITATION | Facility: HOSPITAL | Age: 32
End: 2022-02-18
Payer: COMMERCIAL

## 2022-02-18 DIAGNOSIS — M54.6 ACUTE BILATERAL THORACIC BACK PAIN: ICD-10-CM

## 2022-02-18 PROCEDURE — 97110 THERAPEUTIC EXERCISES: CPT | Performed by: PHYSICAL THERAPIST

## 2022-02-18 NOTE — PROGRESS NOTES
[facility]   Physical Therapy Treatment Note     Name: Chad Rajan  Clinic Number: [id number]    Therapy Diagnosis:   Encounter Diagnosis   Name Primary?    Acute bilateral thoracic back pain      Physician: Mustapha Wilson PA*    Visit Date: 2/18/2022    Physician Orders: PT Eval and Treat   Medical Diagnosis from Referral:   S22.080D (ICD-10-CM) - Compression fracture of T12 vertebra with routine healing, subsequent encounter   M54.6,M54.50 (ICD-10-CM) - Dorsalgia of thoracolumbar region         Evaluation Date: 1/3/2022  Authorization Period Expiration: 12/31/2022  Plan of Care Expiration: 2/17/2022  Visit # / Visits authorized: 9/20  FOTO: 2/3      PTA Visit #: 0/5     Time In: 0905  Time Out: 0945  Total Billable Time: 40 minutes    Precautions: Standard    SUBJECTIVE     Pt reports: no increase in low back pain. Continues to increase activity at home.  .  He was compliant with home exercise program.  Response to previous treatment: soreness  Functional change: None yet    Pain: 2/10  Location: bilateral back      OBJECTIVE     Objective Measures updated at progress report unless specified.     TREATMENT     Chad received the treatments listed below:      Chad received therapeutic exercises to develop strength, posture and core stabilization for 40 minutes including:    Elliptical 6' forward    Table top position:  Marches (4) 15x  UE (20x)  Combined UE and LE 2x12  KB pullovers 10x# (25x)    Single leg bridges 30x each leg  Monster walks BTB 3 laps  Lateral squat walks BTB 3 laps  SL Deadlifts on turf. 20#KB 2x12 bilateral  Pallof press walkouts 12x each way - 4 plates  Side planks with hip ABD 20x each leg  Prone planks, anterior slides, 10x each leg  Standing Clamshells BTB 30x each leg  Trap bar deadlift 95#, 3x6      HELD:  Standing Rows - both arms - 6 plates, 3x10  Prone plank arm push outs 3 sets 5 on each arm.   Bosu squats 20x, 15#KB  Bridges with band GTB  30x  Quadruped hip ABD BTB 25x each leg  Prone UE/LE alt on stability ball 20x      Chad received the following manual therapy techniques: Myofacial release and Soft tissue Mobilization were applied to the: low back for 0 minutes, including:      Chad participated in neuromuscular re-education activities to improve: Balance, Sense and Posture for 0 minutes. The following activities were included:      Chad participated in dynamic functional therapeutic activities to improve functional performance for 0  minutes, including:        Patient Education and Home Exercises     Education provided:   - HEP provided  - continue progress strengthening program at home     Written Home Exercises Provided: yes.  Exercises were reviewed and Chad was able to demonstrate them prior to the end of the session.  Chad demonstrated good  understanding of the education provided.      See EMR under Patient Instructions for exercises provided on evaluatin.    ASSESSMENT     Pt feels fatigued more than pain in back following session. Pt did have some difficulty with deadlift but did not have pain overall. Pt will continue to improve with core and hip strength progressions. Pt will be discharged next visit.     Chad Is progressing well towards his goals.   Pt prognosis is Excellent.     Pt will continue to benefit from skilled outpatient physical therapy to address the deficits listed in the problem list box on initial evaluation, provide pt/family education and to maximize pt's level of independence in the home and community environment.     Pt's spiritual, cultural and educational needs considered and pt agreeable to plan of care and goals.     Anticipated barriers to physical therapy: None    Goals:   Short Term Goals (3 Weeks): (Progressing, not met)  1. Patient will be compliant with home exercise program to supplement therapy in promoting functional mobility.  2. Patient will perform box lift (10#) with good control to  demonstrate improved core strength.     Long Term Goals (6 Weeks): (Progressing, not met)  1. Patient will improve FOTO score to </= 15% limited to decrease perceived limitation with maintaining/changing body position.   2. Patient will perform deep squat with 25# with good control to demonstrate improved core strength.  3. Patient will improve impaired lower extremity myotomes/manual muscle tests to >/=5/5 to improve strength for functional tasks.    PLAN     Continue with physical therapy as planned.     Plan of care Certification: 1/3/2022 to 2/17/2022.     Outpatient Physical Therapy 2 times weekly for 6 weeks to include the following interventions: Electrical Stimulation IFC, Gait Training, Manual Therapy, Moist Heat/ Ice, Neuromuscular Re-ed, Orthotic Management and Training, Patient Education, Self Care, Therapeutic Activities, Therapeutic Exercise and Ultrasound, ASTYM, Kinesiotaping PRN, Functional Dry Needling    Ben Chairez, PT, DPT

## 2022-02-22 ENCOUNTER — CLINICAL SUPPORT (OUTPATIENT)
Dept: REHABILITATION | Facility: HOSPITAL | Age: 32
End: 2022-02-22
Payer: COMMERCIAL

## 2022-02-22 DIAGNOSIS — M54.6 ACUTE BILATERAL THORACIC BACK PAIN: Primary | ICD-10-CM

## 2022-02-22 PROCEDURE — 97110 THERAPEUTIC EXERCISES: CPT | Performed by: PHYSICAL THERAPIST

## 2022-02-22 NOTE — PROGRESS NOTES
[facility]   Physical Therapy Discharge Note    Name: Chad Rajan  Clinic Number: [id number]    Therapy Diagnosis:   Encounter Diagnosis   Name Primary?    Acute bilateral thoracic back pain Yes     Physician: Mustapha Wilson PA*    Visit Date: 2/22/2022    Physician Orders: PT Eval and Treat   Medical Diagnosis from Referral:   S22.080D (ICD-10-CM) - Compression fracture of T12 vertebra with routine healing, subsequent encounter   M54.6,M54.50 (ICD-10-CM) - Dorsalgia of thoracolumbar region         Evaluation Date: 1/3/2022  Authorization Period Expiration: 12/31/2022  Plan of Care Expiration: 2/17/2022  Visit # / Visits authorized: 9/20  FOTO: on DC    Time In: 230  Time Out: 315  Total Billable Time: 45 minutes    Precautions: Standard    SUBJECTIVE     Pt reports: no increase in pain noted, has been increasing activity at gym last 2 weeks.   .  He was compliant with home exercise program.  Response to previous treatment: soreness  Functional change: None yet    Pain: 0/10  Location: bilateral back      OBJECTIVE     Objective Measures updated at progress report unless specified.     Range of Motion/Strength:   Thoracolumbar AROM Pain/Dysfunction with Movement   Flexion Full     Extension Full    Right side bending Full    Left side bending Full     Right rotation Full    Left rotation Full           Hip Right Left Pain/Dysfunction with Movement   AROM/PROM Full Full           L/E MMT Right Left Pain/Dysfunction with Movement   Hip Flexion 5/5 5/5     Hip Extension 5/5 5/5     Hip Abduction 5/5 5/5     Hip Adduction 5/5 5/5     Knee Flexion 5/5 5/5     Knee Extension 5/5 5/5     Ankle DF 5/5 5/5     Ankle PF 5/5 5/5           Selective Functional Movement Assessment (SFMA) FN: functional, nonpainful. FP: functional, painful. DP: dysfunctional, painful. DN: dysfunctional, nonpainful.   overhead deep squat FN         CMS Impairment/Limitation/Restriction for FOTO thoracic spine  Survey     Therapist reviewed FOTO scores for Chad Rajan on 2/7/2022.   FOTO documents entered into [employer] - see Media section.     Limitation Score: 25%         TREATMENT     Chad received the treatments listed below:      Chad received therapeutic exercises to develop strength, posture and core stabilization for 45 minutes including:    Elliptical 6' forward  Table top position:  Marches (4) 15x  UE (20x)  Combined UE and LE 2x12  KB pullovers 15x# (25x)  Monster walks BTB 3 laps  Lateral squat walks BTB 3 laps  SL Deadlifts on turf. 15#KB 2x12 bilateral  Pallof press 12 laps, 4 plates (walk outs)  Side planks with hip ABD 20x each leg  Standing Clamshells BTB 30x each leg  Prone planks with arm reaches 10x each way  Standing Rows - both arms - 6 plates, 3x10      Chad received the following manual therapy techniques: Myofacial release and Soft tissue Mobilization were applied to the: low back for 0 minutes, including:      Chad participated in neuromuscular re-education activities to improve: Balance, Sense and Posture for 0 minutes. The following activities were included:      Chad participated in dynamic functional therapeutic activities to improve functional performance for 0  minutes, including:        Patient Education and Home Exercises     Education provided:   - HEP provided  - continue progress strengthening program at home     Written Home Exercises Provided: yes.  Exercises were reviewed and Chad was able to demonstrate them prior to the end of the session.  Chad demonstrated good  understanding of the education provided.      See EMR under Patient Instructions for exercises provided on evaluatin.    ASSESSMENT     Pt completed full session with no increase in low back pain. Pt reported to clicking or popping. Pt will educated to continue gym workouts to sustain gains made through therapy. Pt will monitor pain and call back if needed.     Chad Is progressing well towards his goals.   Pt  prognosis is Excellent.     Pt will continue to benefit from skilled outpatient physical therapy to address the deficits listed in the problem list box on initial evaluation, provide pt/family education and to maximize pt's level of independence in the home and community environment.     Pt's spiritual, cultural and educational needs considered and pt agreeable to plan of care and goals.     Anticipated barriers to physical therapy: None    Goals:   Short Term Goals (3 Weeks):   1. Patient will be compliant with home exercise program to supplement therapy in promoting functional mobility. MET  2. Patient will perform box lift (10#) with good control to demonstrate improved core strength. MET     Long Term Goals (6 Weeks): (Progressing, not met)  1. Patient will improve FOTO score to </= 15% limited to decrease perceived limitation with maintaining/changing body position. MET  2. Patient will perform deep squat with 25# with good control to demonstrate improved core strength. MET  3. Patient will improve impaired lower extremity myotomes/manual muscle tests to >/=5/5 to improve strength for functional tasks.MET    PLAN     Discharge today.    Ben Chairez, PT, DPT

## 2022-02-28 PROBLEM — M54.6 ACUTE BILATERAL THORACIC BACK PAIN: Status: RESOLVED | Noted: 2022-01-05 | Resolved: 2022-02-28

## 2022-03-01 ENCOUNTER — PATIENT MESSAGE (OUTPATIENT)
Dept: REHABILITATION | Facility: HOSPITAL | Age: 32
End: 2022-03-01
Payer: COMMERCIAL